# Patient Record
Sex: MALE | Race: WHITE | NOT HISPANIC OR LATINO | Employment: FULL TIME | URBAN - METROPOLITAN AREA
[De-identification: names, ages, dates, MRNs, and addresses within clinical notes are randomized per-mention and may not be internally consistent; named-entity substitution may affect disease eponyms.]

---

## 2023-04-08 LAB
ALANINE AMINOTRANSFERASE (SGPT) (U/L) IN SER/PLAS: 16 U/L (ref 10–52)
ALBUMIN (G/DL) IN SER/PLAS: 4 G/DL (ref 3.4–5)
ALKALINE PHOSPHATASE (U/L) IN SER/PLAS: 69 U/L (ref 33–120)
ANION GAP IN SER/PLAS: 9 MMOL/L (ref 10–20)
ASPARTATE AMINOTRANSFERASE (SGOT) (U/L) IN SER/PLAS: 16 U/L (ref 9–39)
BILIRUBIN TOTAL (MG/DL) IN SER/PLAS: 0.7 MG/DL (ref 0–1.2)
CALCIUM (MG/DL) IN SER/PLAS: 8.6 MG/DL (ref 8.6–10.3)
CARBON DIOXIDE, TOTAL (MMOL/L) IN SER/PLAS: 27 MMOL/L (ref 21–32)
CHLORIDE (MMOL/L) IN SER/PLAS: 106 MMOL/L (ref 98–107)
CHOLESTEROL (MG/DL) IN SER/PLAS: 156 MG/DL (ref 0–199)
CHOLESTEROL IN HDL (MG/DL) IN SER/PLAS: 42.7 MG/DL
CHOLESTEROL/HDL RATIO: 3.7
CREATININE (MG/DL) IN SER/PLAS: 0.8 MG/DL (ref 0.5–1.3)
ESTIMATED AVERAGE GLUCOSE FOR HBA1C: 111 MG/DL
GFR MALE: >90 ML/MIN/1.73M2
GLUCOSE (MG/DL) IN SER/PLAS: 91 MG/DL (ref 74–99)
HEMOGLOBIN A1C/HEMOGLOBIN TOTAL IN BLOOD: 5.5 %
LDL: 101 MG/DL (ref 0–99)
POTASSIUM (MMOL/L) IN SER/PLAS: 4.2 MMOL/L (ref 3.5–5.3)
PROTEIN TOTAL: 6.6 G/DL (ref 6.4–8.2)
SODIUM (MMOL/L) IN SER/PLAS: 138 MMOL/L (ref 136–145)
TRIGLYCERIDE (MG/DL) IN SER/PLAS: 62 MG/DL (ref 0–149)
UREA NITROGEN (MG/DL) IN SER/PLAS: 14 MG/DL (ref 6–23)
VLDL: 12 MG/DL (ref 0–40)

## 2023-04-13 ENCOUNTER — OFFICE VISIT (OUTPATIENT)
Dept: PRIMARY CARE | Facility: CLINIC | Age: 53
End: 2023-04-13
Payer: COMMERCIAL

## 2023-04-13 VITALS
DIASTOLIC BLOOD PRESSURE: 72 MMHG | TEMPERATURE: 97.8 F | SYSTOLIC BLOOD PRESSURE: 126 MMHG | WEIGHT: 168 LBS | HEART RATE: 67 BPM | OXYGEN SATURATION: 93 %

## 2023-04-13 DIAGNOSIS — J30.9 ALLERGIC RHINITIS, UNSPECIFIED SEASONALITY, UNSPECIFIED TRIGGER: ICD-10-CM

## 2023-04-13 DIAGNOSIS — M25.562 PAIN IN BOTH KNEES, UNSPECIFIED CHRONICITY: ICD-10-CM

## 2023-04-13 DIAGNOSIS — M25.561 PAIN IN BOTH KNEES, UNSPECIFIED CHRONICITY: ICD-10-CM

## 2023-04-13 DIAGNOSIS — R73.01 IMPAIRED FASTING BLOOD SUGAR: ICD-10-CM

## 2023-04-13 DIAGNOSIS — J45.20 MILD INTERMITTENT INTRINSIC ASTHMA WITHOUT STATUS ASTHMATICUS WITHOUT COMPLICATION (HHS-HCC): Primary | ICD-10-CM

## 2023-04-13 DIAGNOSIS — E78.2 MIXED HYPERLIPIDEMIA: ICD-10-CM

## 2023-04-13 PROCEDURE — 99214 OFFICE O/P EST MOD 30 MIN: CPT | Performed by: FAMILY MEDICINE

## 2023-04-13 RX ORDER — ALBUTEROL SULFATE 90 UG/1
2 AEROSOL, METERED RESPIRATORY (INHALATION) EVERY 6 HOURS PRN
COMMUNITY
Start: 2021-12-02

## 2023-04-13 RX ORDER — MONTELUKAST SODIUM 10 MG/1
10 TABLET ORAL NIGHTLY
Qty: 30 TABLET | Refills: 5 | Status: SHIPPED | OUTPATIENT
Start: 2023-04-13 | End: 2023-10-12 | Stop reason: SDUPTHER

## 2023-04-13 RX ORDER — FLUTICASONE FUROATE, UMECLIDINIUM BROMIDE AND VILANTEROL TRIFENATATE 100; 62.5; 25 UG/1; UG/1; UG/1
1 POWDER RESPIRATORY (INHALATION) DAILY
COMMUNITY
End: 2023-10-12 | Stop reason: SDUPTHER

## 2023-04-13 ASSESSMENT — ENCOUNTER SYMPTOMS
HEADACHES: 0
SLEEP DISTURBANCE: 0
DIFFICULTY URINATING: 0
BLOOD IN STOOL: 0
DYSURIA: 0
FATIGUE: 0
POLYDIPSIA: 0
DYSPHORIC MOOD: 0
ABDOMINAL DISTENTION: 0
SHORTNESS OF BREATH: 0
PALPITATIONS: 0
DIARRHEA: 0
POLYPHAGIA: 0
CONSTIPATION: 0
VOMITING: 0
ABDOMINAL PAIN: 0
NAUSEA: 0
DIZZINESS: 0

## 2023-04-13 NOTE — PATIENT INSTRUCTIONS
Recommend a predominant whole foods plant based diet.  Cut back on meat, dairy, salt and oils. Increase fiber in your diet.  Decrease alcohol as much as possible if you drink. Recommend regular exercise most days of the week.    Trial montelukast. You may not need trelegy if this helps.     You were referred for xrays. Trial home PT exercises. If no improvement, call for PT referral    Follow up in 6 months, sooner if needed

## 2023-04-13 NOTE — PROGRESS NOTES
"Subjective   Patient ID: Umer Gallegos is a 53 y.o. male who presents for Hyperlipidemia (Recheck) and COPD (Review  BW).    Hyperlipidemia  Pertinent negatives include no chest pain or shortness of breath.      Asthma: remains controlled. Using albuterol rarely. Concerned about inhaler cost  AR: stable. Not using meds   Lipids: Stable.  HDL 42,  (91), triglycerides 62  Prediabetes: Remains controlled.  A1c 5.5 and unchanged.  FBS normal  Knee pain: recurrent and onset for \"a while.\" No injury. No med for sxs. Occ feel swollen    Review of Systems   Constitutional:  Negative for fatigue.   HENT: Negative.     Eyes:  Negative for visual disturbance.   Respiratory:  Negative for shortness of breath.    Cardiovascular:  Negative for chest pain and palpitations.   Gastrointestinal:  Negative for abdominal distention, abdominal pain, blood in stool, constipation, diarrhea, nausea and vomiting.   Endocrine: Negative for cold intolerance, heat intolerance, polydipsia, polyphagia and polyuria.   Genitourinary:  Negative for difficulty urinating and dysuria.   Musculoskeletal:         As above   Skin:  Negative for rash.   Neurological:  Negative for dizziness and headaches.   Psychiatric/Behavioral:  Negative for dysphoric mood and sleep disturbance.        Objective   /72   Pulse 67   Temp 36.6 °C (97.8 °F) (Temporal)   Wt 76.2 kg (168 lb)   SpO2 93%     Physical Exam  Vitals and nursing note reviewed.   Constitutional:       General: He is not in acute distress.     Appearance: Normal appearance.   HENT:      Head: Normocephalic.   Eyes:      General: No scleral icterus.     Pupils: Pupils are equal, round, and reactive to light.   Neck:      Vascular: No carotid bruit.   Cardiovascular:      Rate and Rhythm: Normal rate and regular rhythm.      Pulses: Normal pulses.      Heart sounds: No murmur heard.  Pulmonary:      Effort: Pulmonary effort is normal. No respiratory distress.      Breath sounds: " Normal breath sounds.   Abdominal:      General: Bowel sounds are normal.   Musculoskeletal:      Right lower leg: No edema.      Left lower leg: No edema.      Comments: FROM b/l knees. No edema or joint line TTP. No obvious lax. Neg drawer sign. Gait nml   Skin:     General: Skin is warm.   Neurological:      General: No focal deficit present.      Mental Status: He is alert.      Cranial Nerves: No cranial nerve deficit.   Psychiatric:         Mood and Affect: Mood normal.         Assessment/Plan   Problem List Items Addressed This Visit          Respiratory    Intrinsic asthma without status asthmaticus - Primary    Relevant Medications    montelukast (Singulair) 10 mg tablet    Other Relevant Orders    Follow Up In Primary Care       Endocrine/Metabolic    Impaired fasting blood sugar    Relevant Orders    Follow Up In Primary Care    Comprehensive Metabolic Panel       Other    Mixed hyperlipidemia    Relevant Orders    Follow Up In Primary Care    Comprehensive Metabolic Panel    Lipid Panel     Other Visit Diagnoses       Allergic rhinitis, unspecified seasonality, unspecified trigger        Relevant Medications    montelukast (Singulair) 10 mg tablet    Pain in both knees, unspecified chronicity        Relevant Orders    XR knee 4+ views bilateral          Discussed blood work

## 2023-04-25 ENCOUNTER — TELEPHONE (OUTPATIENT)
Dept: PRIMARY CARE | Facility: CLINIC | Age: 53
End: 2023-04-25
Payer: COMMERCIAL

## 2023-04-25 NOTE — TELEPHONE ENCOUNTER
----- Message from Lucille Aguilar, DO sent at 4/24/2023  5:05 PM EDT -----  Please tell pt that his xrays do not show any significant abnormalities. See if willing to do PT

## 2023-07-31 ENCOUNTER — OFFICE VISIT (OUTPATIENT)
Dept: PRIMARY CARE | Facility: CLINIC | Age: 53
End: 2023-07-31
Payer: COMMERCIAL

## 2023-07-31 VITALS
HEART RATE: 76 BPM | SYSTOLIC BLOOD PRESSURE: 120 MMHG | OXYGEN SATURATION: 98 % | WEIGHT: 168 LBS | DIASTOLIC BLOOD PRESSURE: 72 MMHG | TEMPERATURE: 96.5 F

## 2023-07-31 DIAGNOSIS — S29.012A STRAIN OF THORACIC BACK REGION: Primary | ICD-10-CM

## 2023-07-31 PROCEDURE — 99214 OFFICE O/P EST MOD 30 MIN: CPT | Performed by: PHYSICIAN ASSISTANT

## 2023-07-31 RX ORDER — DICLOFENAC SODIUM 75 MG/1
75 TABLET, DELAYED RELEASE ORAL 2 TIMES DAILY PRN
Qty: 30 TABLET | Refills: 0 | Status: SHIPPED | OUTPATIENT
Start: 2023-07-31 | End: 2023-10-12 | Stop reason: ALTCHOICE

## 2023-07-31 RX ORDER — TIZANIDINE 2 MG/1
1-2 TABLET ORAL EVERY 8 HOURS PRN
Qty: 30 TABLET | Refills: 0 | Status: SHIPPED | OUTPATIENT
Start: 2023-07-31 | End: 2023-10-12 | Stop reason: ALTCHOICE

## 2023-07-31 ASSESSMENT — ENCOUNTER SYMPTOMS
SHORTNESS OF BREATH: 0
ABDOMINAL PAIN: 0
PALPITATIONS: 0

## 2023-07-31 NOTE — LETTER
July 31, 2023     Patient: Umer Gallegos   YOB: 1970   Date of Visit: 7/31/2023       To Whom It May Concern:    Umer Gallegos was seen in my clinic on 7/31/2023 at 2:50 pm. Please excuse Umer for his absence from work on this day to make the appointment.    If you have any questions or concerns, please don't hesitate to call.         Sincerely,         aR Bay PA-C        CC: No Recipients

## 2023-07-31 NOTE — PROGRESS NOTES
Subjective   Patient ID: Umer Gallegos is a 53 y.o. male who presents for Back Pain (Nki x 4 days).    HPI   Getting right upper back pain x 4 days. Thinks may have strained something when lifting his 3 year old granddaughter. Hurts in his upper back to lift his right arm.  No significant back pain.  No pain radiating down his arm or paresthesias in arm.  No chest pains or shortness of breath or significant cough.  Symptoms are not worsening.    Used some ibuprofen once and it helped a little.     reports that he has been smoking cigarettes. He has been smoking an average of .5 packs per day. He has never used smokeless tobacco.    Review of Systems   Respiratory:  Negative for shortness of breath.    Cardiovascular:  Negative for chest pain and palpitations.   Gastrointestinal:  Negative for abdominal pain.       Objective   /72   Pulse 76   Temp 35.8 °C (96.5 °F)   Wt 76.2 kg (168 lb)   SpO2 98%     Physical Exam  HENT:      Head: Normocephalic.   Eyes:      General: No scleral icterus.  Cardiovascular:      Rate and Rhythm: Regular rhythm.   Pulmonary:      Effort: Pulmonary effort is normal.      Breath sounds: Normal breath sounds.   Abdominal:      Palpations: Abdomen is soft. There is no mass.      Tenderness: There is no abdominal tenderness.   Musculoskeletal:      Comments: Has tenderness and tension over the right upper thoracic musculature without vertebral tenderness.   Skin:     General: Skin is warm and dry.   Neurological:      Mental Status: He is alert.   Psychiatric:         Mood and Affect: Affect normal.         Assessment/Plan   Diagnoses and all orders for this visit:  Strain of thoracic back region  -     diclofenac (Voltaren) 75 mg EC tablet; Take 1 tablet (75 mg) by mouth 2 times a day as needed (pain). Do not crush, chew, or split.  -     tiZANidine (Zanaflex) 2 mg tablet; Take 0.5-1 tablets (1-2 mg) by mouth every 8 hours if needed for muscle spasms.       Use heating pad  on back and reviewed back stretches to do daily.  Rx diclofenac twice daily with food.  Rx tizanidine half to 1 p.o. 3 times daily as needed (warned of sedation).  Follow-up if symptoms significantly increase or persist.

## 2023-07-31 NOTE — LETTER
August 1, 2023     Patient: Umer Gallegos   YOB: 1970   Date of Visit: 7/31/2023       To Whom It May Concern:    Umer Gallegos was seen in my clinic on 7/31/2023 at 2:50 pm. Please excuse Umer for his absence from work 7/31/23 - 8/1/23.     If you have any questions or concerns, please don't hesitate to call.         Sincerely,         Ra Bay PA-C        CC: No Recipients

## 2023-08-01 ENCOUNTER — TELEPHONE (OUTPATIENT)
Dept: PRIMARY CARE | Facility: CLINIC | Age: 53
End: 2023-08-01
Payer: COMMERCIAL

## 2023-08-01 NOTE — TELEPHONE ENCOUNTER
Patient was informed if he needed another day to call in. He needs a work excuse today. He saw [facility] call wife when ready to .

## 2023-10-06 ENCOUNTER — LAB (OUTPATIENT)
Dept: LAB | Facility: LAB | Age: 53
End: 2023-10-06
Payer: COMMERCIAL

## 2023-10-06 DIAGNOSIS — R73.01 IMPAIRED FASTING BLOOD SUGAR: ICD-10-CM

## 2023-10-06 DIAGNOSIS — E78.2 MIXED HYPERLIPIDEMIA: ICD-10-CM

## 2023-10-06 LAB
ALBUMIN SERPL BCP-MCNC: 4 G/DL (ref 3.4–5)
ALP SERPL-CCNC: 68 U/L (ref 33–120)
ALT SERPL W P-5'-P-CCNC: 20 U/L (ref 10–52)
ANION GAP SERPL CALC-SCNC: 11 MMOL/L (ref 10–20)
AST SERPL W P-5'-P-CCNC: 17 U/L (ref 9–39)
BILIRUB SERPL-MCNC: 1 MG/DL (ref 0–1.2)
BUN SERPL-MCNC: 13 MG/DL (ref 6–23)
CALCIUM SERPL-MCNC: 8.9 MG/DL (ref 8.6–10.3)
CHLORIDE SERPL-SCNC: 107 MMOL/L (ref 98–107)
CHOLEST SERPL-MCNC: 151 MG/DL (ref 0–199)
CHOLESTEROL/HDL RATIO: 4.2
CO2 SERPL-SCNC: 24 MMOL/L (ref 21–32)
CREAT SERPL-MCNC: 0.91 MG/DL (ref 0.5–1.3)
GFR SERPL CREATININE-BSD FRML MDRD: >90 ML/MIN/1.73M*2
GLUCOSE SERPL-MCNC: 94 MG/DL (ref 74–99)
HDLC SERPL-MCNC: 35.9 MG/DL
LDLC SERPL CALC-MCNC: 101 MG/DL (ref 140–190)
NON HDL CHOLESTEROL: 115 MG/DL (ref 0–149)
POTASSIUM SERPL-SCNC: 4.3 MMOL/L (ref 3.5–5.3)
PROT SERPL-MCNC: 6.5 G/DL (ref 6.4–8.2)
SODIUM SERPL-SCNC: 138 MMOL/L (ref 136–145)
TRIGL SERPL-MCNC: 73 MG/DL (ref 0–149)
VLDL: 15 MG/DL (ref 0–40)

## 2023-10-06 PROCEDURE — 80061 LIPID PANEL: CPT

## 2023-10-06 PROCEDURE — 80053 COMPREHEN METABOLIC PANEL: CPT

## 2023-10-06 PROCEDURE — 36415 COLL VENOUS BLD VENIPUNCTURE: CPT

## 2023-10-12 ENCOUNTER — OFFICE VISIT (OUTPATIENT)
Dept: PRIMARY CARE | Facility: CLINIC | Age: 53
End: 2023-10-12
Payer: COMMERCIAL

## 2023-10-12 VITALS
DIASTOLIC BLOOD PRESSURE: 68 MMHG | SYSTOLIC BLOOD PRESSURE: 116 MMHG | HEART RATE: 61 BPM | TEMPERATURE: 97.9 F | OXYGEN SATURATION: 96 % | BODY MASS INDEX: 26.68 KG/M2 | WEIGHT: 166 LBS | HEIGHT: 66 IN

## 2023-10-12 DIAGNOSIS — R73.01 IMPAIRED FASTING BLOOD SUGAR: ICD-10-CM

## 2023-10-12 DIAGNOSIS — Z12.5 SCREENING FOR PROSTATE CANCER: ICD-10-CM

## 2023-10-12 DIAGNOSIS — J30.9 ALLERGIC RHINITIS, UNSPECIFIED SEASONALITY, UNSPECIFIED TRIGGER: ICD-10-CM

## 2023-10-12 DIAGNOSIS — J45.20 MILD INTERMITTENT INTRINSIC ASTHMA WITHOUT STATUS ASTHMATICUS WITHOUT COMPLICATION (HHS-HCC): ICD-10-CM

## 2023-10-12 DIAGNOSIS — E78.2 MIXED HYPERLIPIDEMIA: Primary | ICD-10-CM

## 2023-10-12 DIAGNOSIS — Z00.00 ROUTINE GENERAL MEDICAL EXAMINATION AT A HEALTH CARE FACILITY: ICD-10-CM

## 2023-10-12 PROCEDURE — 99214 OFFICE O/P EST MOD 30 MIN: CPT | Performed by: FAMILY MEDICINE

## 2023-10-12 PROCEDURE — 99396 PREV VISIT EST AGE 40-64: CPT | Performed by: FAMILY MEDICINE

## 2023-10-12 RX ORDER — MONTELUKAST SODIUM 10 MG/1
10 TABLET ORAL NIGHTLY
Qty: 90 TABLET | Refills: 1 | Status: SHIPPED | OUTPATIENT
Start: 2023-10-12 | End: 2024-04-11

## 2023-10-12 RX ORDER — FLUTICASONE FUROATE, UMECLIDINIUM BROMIDE AND VILANTEROL TRIFENATATE 100; 62.5; 25 UG/1; UG/1; UG/1
1 POWDER RESPIRATORY (INHALATION) DAILY
Qty: 28 EACH | Refills: 0 | Status: SHIPPED | OUTPATIENT
Start: 2023-10-12 | End: 2024-03-21

## 2023-10-12 ASSESSMENT — ENCOUNTER SYMPTOMS
ACTIVITY CHANGE: 0
FREQUENCY: 0
WHEEZING: 0
SORE THROAT: 0
PALPITATIONS: 0
LIGHT-HEADEDNESS: 0
APPETITE CHANGE: 0
VOMITING: 0
ABDOMINAL PAIN: 0
CHILLS: 0
ARTHRALGIAS: 0
DIARRHEA: 0
DIFFICULTY URINATING: 0
NAUSEA: 0
DIZZINESS: 0
RHINORRHEA: 0
DYSURIA: 0
MYALGIAS: 0
SHORTNESS OF BREATH: 0
COUGH: 0
WEAKNESS: 0
NUMBNESS: 0
FEVER: 0
CONSTIPATION: 0

## 2023-10-12 ASSESSMENT — PATIENT HEALTH QUESTIONNAIRE - PHQ9
SUM OF ALL RESPONSES TO PHQ9 QUESTIONS 1 AND 2: 0
1. LITTLE INTEREST OR PLEASURE IN DOING THINGS: NOT AT ALL
2. FEELING DOWN, DEPRESSED OR HOPELESS: NOT AT ALL

## 2023-10-12 NOTE — PATIENT INSTRUCTIONS
Recommend a predominant whole foods plant based diet.  Cut back on meat, dairy, salt and oils. Increase fiber in your diet.  Decrease alcohol as much as possible if you drink. Recommend regular exercise most days of the week.    Continue your current meds    Return in 6 months,sooner if needed

## 2023-10-12 NOTE — PROGRESS NOTES
Subjective   Patient ID: Umer Gallegos is a 53 y.o. male who presents for Asthma (Recheck) and Hyperlipidemia (Review BW), CPE and multiple issues.     HPI     URI: he had symptoms of a URI last week including sore throat, cough, postnasal drip.  He states that he feels better today and is back to 100%.  He denies fever, chills.  Asthma: Continues to be well controlled.  He is using the montelukast with significant relief.  Taking Trelegy and albuterol rarely.  He will use albuterol for an acute attack and Trelegy if his symptoms flare for couple days.    AR: Well-controlled.  Not using any OTC meds at this time.    Lipids: Stable.  HDL 35, , triglycerides 73  Prediabetes: Remains controlled.  Last A1c 5.5. FBS with the most recent blood work WNL  Knee pain: He has not had significant pain in either knee. He has been avoiding bowling which typically flares his pain  BMI: 26. Active.     Health maintenance:  Cologuard negative, completed on 11/19/2021  Patient agreeable to add on PSA to next blood work  Patient declined influenza or pneumococcal vaccinations as well as HIV and hep C screening    Review of Systems   Constitutional:  Negative for activity change, appetite change, chills and fever.   HENT:  Negative for congestion, ear pain, postnasal drip, rhinorrhea and sore throat.    Respiratory:  Negative for cough, shortness of breath and wheezing.    Cardiovascular:  Negative for chest pain and palpitations.   Gastrointestinal:  Negative for abdominal pain, constipation, diarrhea, nausea and vomiting.   Genitourinary:  Negative for difficulty urinating, dysuria and frequency.   Musculoskeletal:  Negative for arthralgias and myalgias.   Skin:  Negative for rash.   Neurological:  Negative for dizziness, weakness, light-headedness and numbness.       Objective   /68   Pulse 61   Temp 36.6 °C (97.9 °F)   Wt 75.3 kg (166 lb)   SpO2 96%   BMI 26.81 kg/m²     Physical Exam  Vitals and nursing note  reviewed.   Constitutional:       General: He is not in acute distress.     Appearance: Normal appearance.   HENT:      Head: Normocephalic and atraumatic.      Right Ear: Tympanic membrane and ear canal normal.      Left Ear: Tympanic membrane and ear canal normal.      Nose: No congestion or rhinorrhea.      Mouth/Throat:      Mouth: Mucous membranes are moist.      Pharynx: Oropharynx is clear.   Eyes:      General: No scleral icterus.     Conjunctiva/sclera: Conjunctivae normal.   Cardiovascular:      Rate and Rhythm: Normal rate and regular rhythm.      Pulses: Normal pulses.      Heart sounds: Normal heart sounds. No murmur heard.  Pulmonary:      Effort: Pulmonary effort is normal. No respiratory distress.      Breath sounds: Normal breath sounds. No wheezing or rhonchi.   Abdominal:      General: Bowel sounds are normal. There is no distension.      Tenderness: There is no abdominal tenderness. There is no guarding or rebound.   Genitourinary:     Comments: Declines exam  Musculoskeletal:         General: Normal range of motion.      Cervical back: Normal range of motion and neck supple. No tenderness.      Right lower leg: No edema.      Left lower leg: No edema.   Lymphadenopathy:      Cervical: No cervical adenopathy.   Skin:     General: Skin is warm.   Neurological:      General: No focal deficit present.      Mental Status: He is alert and oriented to person, place, and time.   Psychiatric:         Mood and Affect: Mood normal.         Assessment/Plan   Diagnoses and all orders for this visit:  Mixed hyperlipidemia  -     Lipid Panel; Future  Mild intermittent intrinsic asthma without status asthmaticus without complication  -     montelukast (Singulair) 10 mg tablet; Take 1 tablet (10 mg) by mouth once daily at bedtime.  -     fluticasone-umeclidin-vilanter (Trelegy Ellipta) 100-62.5-25 mcg blister with device; Inhale 1 puff once daily.  Allergic rhinitis, unspecified seasonality, unspecified  trigger  -     montelukast (Singulair) 10 mg tablet; Take 1 tablet (10 mg) by mouth once daily at bedtime.  Impaired fasting blood sugar  -     Comprehensive Metabolic Panel; Future  -     Hemoglobin A1C; Future  Screening for prostate cancer  -     Prostate Specific Antigen; Future  Routine general medical examination at a health care facility  Patient deferred influenza and pneumococcal vaccines as well as HIV and hep C screening.  Asthma well-controlled-refill montelukast, Roseanna  Repeat blood work in 6 months    Goyo Ruiz DO  Primary Care Sports Medicine Fellow

## 2024-03-20 DIAGNOSIS — J45.20 MILD INTERMITTENT INTRINSIC ASTHMA WITHOUT STATUS ASTHMATICUS WITHOUT COMPLICATION (HHS-HCC): ICD-10-CM

## 2024-03-21 RX ORDER — FLUTICASONE FUROATE, UMECLIDINIUM BROMIDE AND VILANTEROL TRIFENATATE 100; 62.5; 25 UG/1; UG/1; UG/1
1 POWDER RESPIRATORY (INHALATION) DAILY
Qty: 60 EACH | Refills: 2 | Status: SHIPPED | OUTPATIENT
Start: 2024-03-21 | End: 2024-04-11

## 2024-03-21 NOTE — TELEPHONE ENCOUNTER
Wife called needs Prior Auth   Blakegy Ellipta 100-62.5-25 mcg blister with device  [facility] [place]

## 2024-03-25 NOTE — TELEPHONE ENCOUNTER
Received fax from [employer], Pt's Trelegy has been denied.   Covered alternatives are Anoro, Dulera, Advair, Symbicort, Atrovent HFA, Combivent, Spiriva, Asmanex, Flovent and fluticasone Diskus or HFA inhalers.   Please advise. Thanks. JW

## 2024-03-28 DIAGNOSIS — J45.20 MILD INTERMITTENT INTRINSIC ASTHMA WITHOUT STATUS ASTHMATICUS WITHOUT COMPLICATION (HHS-HCC): Primary | ICD-10-CM

## 2024-03-28 RX ORDER — BUDESONIDE AND FORMOTEROL FUMARATE DIHYDRATE 80; 4.5 UG/1; UG/1
2 AEROSOL RESPIRATORY (INHALATION)
Qty: 10.2 G | Refills: 2 | Status: SHIPPED | OUTPATIENT
Start: 2024-03-28 | End: 2024-04-11 | Stop reason: SDUPTHER

## 2024-03-28 NOTE — TELEPHONE ENCOUNTER
Called and spoke with pt, he is willing to try alternative medication  Please send to [facility]. Thanks. JW

## 2024-03-29 ENCOUNTER — TELEPHONE (OUTPATIENT)
Dept: PRIMARY CARE | Facility: CLINIC | Age: 54
End: 2024-03-29
Payer: COMMERCIAL

## 2024-03-29 NOTE — TELEPHONE ENCOUNTER
PA denied for Trelegy,   Coverage is provided when the member has a history of at least a 14-day trial of two preferred medications (medications covered by the plan) that is within the same class and duration of action. Medications include but are not limited to: Advair HFA and Diskus (Brand is covered by plan), Dulera, and Symbicort (Brand is covered by plan). AND documentation must be provided why the preferred Brand product (Symbicort) cannot be used or may cause harm. The plan covers the brand name medication [Symbicort] without a prior authorization. Please have the pharmacy process the brand name medication [Symbicort] using a SHERMAN 9 when processing the prescription.The [facility] Policy for Medical Necessity and [place] Unified Preferred Drug List criteria were reviewed and per [place] Administrative Code Rule 5160-1-01 (C) and 5160-26-03 (B), a medically necessary service must include: generally accepted standards of medical practice, be clinically appropriate in administration, treatment and outcome and be the lowest cost alternative to effectively treat the condition. Please contact your provider to assist you with other treatment options that might be covered under your benefit package, or other services that might be available through the community.      Please advise. Thanks. SALINAS

## 2024-04-04 ENCOUNTER — LAB (OUTPATIENT)
Dept: LAB | Facility: LAB | Age: 54
End: 2024-04-04
Payer: COMMERCIAL

## 2024-04-04 DIAGNOSIS — R73.01 IMPAIRED FASTING BLOOD SUGAR: ICD-10-CM

## 2024-04-04 DIAGNOSIS — E78.2 MIXED HYPERLIPIDEMIA: ICD-10-CM

## 2024-04-04 DIAGNOSIS — Z12.5 SCREENING FOR PROSTATE CANCER: ICD-10-CM

## 2024-04-04 LAB
ALBUMIN SERPL BCP-MCNC: 4.1 G/DL (ref 3.4–5)
ALP SERPL-CCNC: 68 U/L (ref 33–120)
ALT SERPL W P-5'-P-CCNC: 24 U/L (ref 10–52)
ANION GAP SERPL CALC-SCNC: 10 MMOL/L (ref 10–20)
AST SERPL W P-5'-P-CCNC: 19 U/L (ref 9–39)
BILIRUB SERPL-MCNC: 1.5 MG/DL (ref 0–1.2)
BUN SERPL-MCNC: 12 MG/DL (ref 6–23)
CALCIUM SERPL-MCNC: 9.1 MG/DL (ref 8.6–10.3)
CHLORIDE SERPL-SCNC: 103 MMOL/L (ref 98–107)
CHOLEST SERPL-MCNC: 169 MG/DL (ref 0–199)
CHOLESTEROL/HDL RATIO: 3.9
CO2 SERPL-SCNC: 28 MMOL/L (ref 21–32)
CREAT SERPL-MCNC: 0.9 MG/DL (ref 0.5–1.3)
EGFRCR SERPLBLD CKD-EPI 2021: >90 ML/MIN/1.73M*2
EST. AVERAGE GLUCOSE BLD GHB EST-MCNC: 117 MG/DL
GLUCOSE SERPL-MCNC: 87 MG/DL (ref 74–99)
HBA1C MFR BLD: 5.7 %
HDLC SERPL-MCNC: 43.2 MG/DL
LDLC SERPL CALC-MCNC: 108 MG/DL
NON HDL CHOLESTEROL: 126 MG/DL (ref 0–149)
POTASSIUM SERPL-SCNC: 4.2 MMOL/L (ref 3.5–5.3)
PROT SERPL-MCNC: 7.2 G/DL (ref 6.4–8.2)
PSA SERPL-MCNC: 0.8 NG/ML
SODIUM SERPL-SCNC: 137 MMOL/L (ref 136–145)
TRIGL SERPL-MCNC: 87 MG/DL (ref 0–149)
VLDL: 17 MG/DL (ref 0–40)

## 2024-04-04 PROCEDURE — 36415 COLL VENOUS BLD VENIPUNCTURE: CPT

## 2024-04-04 PROCEDURE — 83036 HEMOGLOBIN GLYCOSYLATED A1C: CPT

## 2024-04-04 PROCEDURE — 80053 COMPREHEN METABOLIC PANEL: CPT

## 2024-04-04 PROCEDURE — 80061 LIPID PANEL: CPT

## 2024-04-04 PROCEDURE — 84153 ASSAY OF PSA TOTAL: CPT

## 2024-04-11 ENCOUNTER — OFFICE VISIT (OUTPATIENT)
Dept: PRIMARY CARE | Facility: CLINIC | Age: 54
End: 2024-04-11
Payer: COMMERCIAL

## 2024-04-11 VITALS
BODY MASS INDEX: 27.45 KG/M2 | DIASTOLIC BLOOD PRESSURE: 70 MMHG | OXYGEN SATURATION: 98 % | SYSTOLIC BLOOD PRESSURE: 126 MMHG | TEMPERATURE: 97.7 F | WEIGHT: 170 LBS | HEART RATE: 88 BPM

## 2024-04-11 DIAGNOSIS — J45.20 MILD INTERMITTENT INTRINSIC ASTHMA WITHOUT STATUS ASTHMATICUS WITHOUT COMPLICATION (HHS-HCC): ICD-10-CM

## 2024-04-11 DIAGNOSIS — R73.03 PREDIABETES: ICD-10-CM

## 2024-04-11 DIAGNOSIS — R94.5 LIVER FUNCTION STUDY, ABNORMAL: ICD-10-CM

## 2024-04-11 DIAGNOSIS — E78.2 MIXED HYPERLIPIDEMIA: Primary | ICD-10-CM

## 2024-04-11 PROBLEM — J30.9 ALLERGIC RHINITIS: Status: ACTIVE | Noted: 2024-04-11

## 2024-04-11 PROCEDURE — 99214 OFFICE O/P EST MOD 30 MIN: CPT | Performed by: FAMILY MEDICINE

## 2024-04-11 RX ORDER — BUDESONIDE AND FORMOTEROL FUMARATE DIHYDRATE 80; 4.5 UG/1; UG/1
2 AEROSOL RESPIRATORY (INHALATION)
Qty: 10.2 G | Refills: 3 | Status: SHIPPED | OUTPATIENT
Start: 2024-04-11 | End: 2024-04-12 | Stop reason: SDUPTHER

## 2024-04-11 ASSESSMENT — ENCOUNTER SYMPTOMS
MYALGIAS: 0
ABDOMINAL DISTENTION: 0
NAUSEA: 0
CONSTIPATION: 0
DIARRHEA: 0
POLYPHAGIA: 0
DIZZINESS: 0
SHORTNESS OF BREATH: 0
POLYDIPSIA: 0
ABDOMINAL PAIN: 0
SLEEP DISTURBANCE: 0
FATIGUE: 0
HEADACHES: 0
DYSPHORIC MOOD: 0
PALPITATIONS: 0
VOMITING: 0
BLOOD IN STOOL: 0

## 2024-04-11 NOTE — PROGRESS NOTES
Subjective   Patient ID: Umer Gallegos is a 54 y.o. male who presents for Hyperlipidemia (Review BW) and multiple issues.     Hyperlipidemia  Pertinent negatives include no chest pain, myalgias or shortness of breath.      Lipids: stable. HDL 43, (101), TG 87  LFTs: jillian slightly high 1.5. n oabd pain  Predm: trending up 5.7%  Asthma: symbicort helped. Trelegy not covered. Cat recently passed so feels breathing and allergies may improve    Review of Systems   Constitutional:  Negative for fatigue.   HENT: Negative.     Eyes:  Negative for visual disturbance.   Respiratory:  Negative for shortness of breath.    Cardiovascular:  Negative for chest pain and palpitations.   Gastrointestinal:  Negative for abdominal distention, abdominal pain, blood in stool, constipation, diarrhea, nausea and vomiting.   Endocrine: Negative for polydipsia, polyphagia and polyuria.   Musculoskeletal:  Negative for myalgias.   Skin:  Negative for rash.   Neurological:  Negative for dizziness and headaches.   Psychiatric/Behavioral:  Negative for dysphoric mood and sleep disturbance.        Objective   /70   Pulse 88   Temp 36.5 °C (97.7 °F)   Wt 77.1 kg (170 lb)   SpO2 98%   BMI 27.45 kg/m²     Physical Exam  Vitals and nursing note reviewed.   Constitutional:       General: He is not in acute distress.     Appearance: Normal appearance. He is not toxic-appearing.   HENT:      Head: Normocephalic.   Eyes:      General: No scleral icterus.     Pupils: Pupils are equal, round, and reactive to light.   Neck:      Vascular: No carotid bruit.   Cardiovascular:      Rate and Rhythm: Normal rate and regular rhythm.      Heart sounds: No murmur heard.  Pulmonary:      Effort: Pulmonary effort is normal. No respiratory distress.      Breath sounds: Normal breath sounds.   Abdominal:      Palpations: Abdomen is soft.      Tenderness: There is no abdominal tenderness. There is no guarding.   Musculoskeletal:      Right lower  leg: No edema.      Left lower leg: No edema.   Skin:     General: Skin is warm.   Neurological:      General: No focal deficit present.      Mental Status: He is alert.      Cranial Nerves: No cranial nerve deficit.   Psychiatric:         Mood and Affect: Mood normal.         Assessment/Plan   Problem List Items Addressed This Visit             ICD-10-CM    Intrinsic asthma without status asthmaticus J45.20    Relevant Medications    budesonide-formoteroL (Symbicort) 80-4.5 mcg/actuation inhaler    Other Relevant Orders    Comprehensive Metabolic Panel    Mixed hyperlipidemia - Primary E78.2    Relevant Orders    Comprehensive Metabolic Panel    Lipid Panel     Other Visit Diagnoses         Codes    Prediabetes     R73.03    Relevant Orders    Comprehensive Metabolic Panel    Hemoglobin A1C    Liver function study, abnormal     R94.5    Relevant Orders    Comprehensive Metabolic Panel        Discussed bw and cv risk. Declines statin. Recommendations given

## 2024-04-11 NOTE — PATIENT INSTRUCTIONS
Recommend a predominant low fat whole foods plant based diet.  Cut back on meat, dairy, processed carbs, salt and oils(especially palm and coconut). Increase fiber in your diet.  Decrease alcohol as much as possible if you drink. Recommend regular exercise most days of the week(goal up to 150min per week). Also recommend good sleep habits aiming for 7-8 hours per night.     Continue your current med    Return in 6 months, sooner if needed

## 2024-04-12 ENCOUNTER — TELEPHONE (OUTPATIENT)
Dept: PRIMARY CARE | Facility: CLINIC | Age: 54
End: 2024-04-12
Payer: COMMERCIAL

## 2024-04-12 DIAGNOSIS — J45.20 MILD INTERMITTENT INTRINSIC ASTHMA WITHOUT STATUS ASTHMATICUS WITHOUT COMPLICATION (HHS-HCC): ICD-10-CM

## 2024-04-12 NOTE — TELEPHONE ENCOUNTER
PA on pended med - DENIED    Insurance will cover:  -Advair HYFA- Brand name  -Advair Diskus- Brand name  -Dulera - Brand name  -Symbicort - Brand name    Please advise Thx

## 2024-04-18 RX ORDER — BUDESONIDE AND FORMOTEROL FUMARATE DIHYDRATE 80; 4.5 UG/1; UG/1
2 AEROSOL RESPIRATORY (INHALATION)
Qty: 10.2 G | Refills: 3 | Status: SHIPPED | OUTPATIENT
Start: 2024-04-18 | End: 2025-04-18

## 2024-08-20 DIAGNOSIS — J30.9 ALLERGIC RHINITIS, UNSPECIFIED SEASONALITY, UNSPECIFIED TRIGGER: ICD-10-CM

## 2024-08-20 DIAGNOSIS — J45.20 MILD INTERMITTENT INTRINSIC ASTHMA WITHOUT STATUS ASTHMATICUS WITHOUT COMPLICATION (HHS-HCC): ICD-10-CM

## 2024-08-20 RX ORDER — MONTELUKAST SODIUM 10 MG/1
10 TABLET ORAL NIGHTLY
Qty: 90 TABLET | Refills: 0 | Status: SHIPPED | OUTPATIENT
Start: 2024-08-20 | End: 2024-11-18

## 2024-08-20 NOTE — TELEPHONE ENCOUNTER
Wife called rx line at 913a requesting a refill on pended med    Next OV 10/14  Pt compliant  Last rx - 10/12/23- 6 mos  Pt uses Kristyn Angulox

## 2024-10-05 ENCOUNTER — LAB (OUTPATIENT)
Dept: LAB | Facility: LAB | Age: 54
End: 2024-10-05
Payer: COMMERCIAL

## 2024-10-05 DIAGNOSIS — J45.20 MILD INTERMITTENT INTRINSIC ASTHMA WITHOUT STATUS ASTHMATICUS WITHOUT COMPLICATION (HHS-HCC): ICD-10-CM

## 2024-10-05 DIAGNOSIS — E78.2 MIXED HYPERLIPIDEMIA: ICD-10-CM

## 2024-10-05 DIAGNOSIS — R94.5 LIVER FUNCTION STUDY, ABNORMAL: ICD-10-CM

## 2024-10-05 DIAGNOSIS — R73.03 PREDIABETES: ICD-10-CM

## 2024-10-05 LAB
ALBUMIN SERPL BCP-MCNC: 4.3 G/DL (ref 3.4–5)
ALP SERPL-CCNC: 75 U/L (ref 33–120)
ALT SERPL W P-5'-P-CCNC: 17 U/L (ref 10–52)
ANION GAP SERPL CALC-SCNC: 11 MMOL/L (ref 10–20)
AST SERPL W P-5'-P-CCNC: 16 U/L (ref 9–39)
BILIRUB SERPL-MCNC: 0.4 MG/DL (ref 0–1.2)
BUN SERPL-MCNC: 9 MG/DL (ref 6–23)
CALCIUM SERPL-MCNC: 9.3 MG/DL (ref 8.6–10.3)
CHLORIDE SERPL-SCNC: 106 MMOL/L (ref 98–107)
CHOLEST SERPL-MCNC: 176 MG/DL (ref 0–199)
CHOLESTEROL/HDL RATIO: 3.7
CO2 SERPL-SCNC: 28 MMOL/L (ref 21–32)
CREAT SERPL-MCNC: 0.92 MG/DL (ref 0.5–1.3)
EGFRCR SERPLBLD CKD-EPI 2021: >90 ML/MIN/1.73M*2
EST. AVERAGE GLUCOSE BLD GHB EST-MCNC: 108 MG/DL
GLUCOSE SERPL-MCNC: 88 MG/DL (ref 74–99)
HBA1C MFR BLD: 5.4 %
HDLC SERPL-MCNC: 47.1 MG/DL
LDLC SERPL CALC-MCNC: 111 MG/DL
NON HDL CHOLESTEROL: 129 MG/DL (ref 0–149)
POTASSIUM SERPL-SCNC: 4.5 MMOL/L (ref 3.5–5.3)
PROT SERPL-MCNC: 7 G/DL (ref 6.4–8.2)
SODIUM SERPL-SCNC: 140 MMOL/L (ref 136–145)
TRIGL SERPL-MCNC: 88 MG/DL (ref 0–149)
VLDL: 18 MG/DL (ref 0–40)

## 2024-10-05 PROCEDURE — 83036 HEMOGLOBIN GLYCOSYLATED A1C: CPT

## 2024-10-05 PROCEDURE — 80061 LIPID PANEL: CPT

## 2024-10-05 PROCEDURE — 36415 COLL VENOUS BLD VENIPUNCTURE: CPT

## 2024-10-05 PROCEDURE — 80053 COMPREHEN METABOLIC PANEL: CPT

## 2024-10-14 ENCOUNTER — APPOINTMENT (OUTPATIENT)
Dept: PRIMARY CARE | Facility: CLINIC | Age: 54
End: 2024-10-14
Payer: COMMERCIAL

## 2024-10-22 ENCOUNTER — APPOINTMENT (OUTPATIENT)
Dept: PRIMARY CARE | Facility: CLINIC | Age: 54
End: 2024-10-22
Payer: COMMERCIAL

## 2024-10-22 VITALS
HEIGHT: 66 IN | WEIGHT: 173.2 LBS | TEMPERATURE: 97.8 F | OXYGEN SATURATION: 97 % | HEART RATE: 62 BPM | SYSTOLIC BLOOD PRESSURE: 140 MMHG | DIASTOLIC BLOOD PRESSURE: 70 MMHG | BODY MASS INDEX: 27.83 KG/M2

## 2024-10-22 DIAGNOSIS — Z11.59 NEED FOR HEPATITIS C SCREENING TEST: ICD-10-CM

## 2024-10-22 DIAGNOSIS — E78.2 MIXED HYPERLIPIDEMIA: ICD-10-CM

## 2024-10-22 DIAGNOSIS — Z12.5 SCREENING FOR PROSTATE CANCER: ICD-10-CM

## 2024-10-22 DIAGNOSIS — Z00.00 ROUTINE GENERAL MEDICAL EXAMINATION AT A HEALTH CARE FACILITY: Primary | ICD-10-CM

## 2024-10-22 DIAGNOSIS — J30.9 ALLERGIC RHINITIS, UNSPECIFIED SEASONALITY, UNSPECIFIED TRIGGER: ICD-10-CM

## 2024-10-22 DIAGNOSIS — J45.20 MILD INTERMITTENT INTRINSIC ASTHMA WITHOUT STATUS ASTHMATICUS WITHOUT COMPLICATION (HHS-HCC): ICD-10-CM

## 2024-10-22 DIAGNOSIS — E66.3 OVERWEIGHT (BMI 25.0-29.9): ICD-10-CM

## 2024-10-22 DIAGNOSIS — R73.03 PREDIABETES: ICD-10-CM

## 2024-10-22 DIAGNOSIS — Z12.11 SCREEN FOR COLON CANCER: ICD-10-CM

## 2024-10-22 DIAGNOSIS — R94.5 LIVER FUNCTION STUDY, ABNORMAL: ICD-10-CM

## 2024-10-22 PROCEDURE — 3008F BODY MASS INDEX DOCD: CPT | Performed by: FAMILY MEDICINE

## 2024-10-22 PROCEDURE — 99214 OFFICE O/P EST MOD 30 MIN: CPT | Performed by: FAMILY MEDICINE

## 2024-10-22 PROCEDURE — 99396 PREV VISIT EST AGE 40-64: CPT | Performed by: FAMILY MEDICINE

## 2024-10-22 RX ORDER — MONTELUKAST SODIUM 10 MG/1
10 TABLET ORAL NIGHTLY
Qty: 90 TABLET | Refills: 1 | Status: SHIPPED | OUTPATIENT
Start: 2024-10-22 | End: 2025-04-20

## 2024-10-22 ASSESSMENT — ENCOUNTER SYMPTOMS
DIZZINESS: 0
FATIGUE: 0
ARTHRALGIAS: 0
SLEEP DISTURBANCE: 0
HEADACHES: 0
DIARRHEA: 0
POLYPHAGIA: 0
SHORTNESS OF BREATH: 0
DYSPHORIC MOOD: 0
POLYDIPSIA: 0
VOMITING: 0
PALPITATIONS: 0
DIFFICULTY URINATING: 0
NAUSEA: 0
BLOOD IN STOOL: 0
CONSTIPATION: 0
ABDOMINAL PAIN: 0
MYALGIAS: 0
DYSURIA: 0

## 2024-10-22 ASSESSMENT — PATIENT HEALTH QUESTIONNAIRE - PHQ9
SUM OF ALL RESPONSES TO PHQ9 QUESTIONS 1 AND 2: 0
1. LITTLE INTEREST OR PLEASURE IN DOING THINGS: NOT AT ALL
2. FEELING DOWN, DEPRESSED OR HOPELESS: NOT AT ALL
SUM OF ALL RESPONSES TO PHQ9 QUESTIONS 1 AND 2: 0
1. LITTLE INTEREST OR PLEASURE IN DOING THINGS: NOT AT ALL
2. FEELING DOWN, DEPRESSED OR HOPELESS: NOT AT ALL

## 2024-10-22 NOTE — PROGRESS NOTES
Subjective   Patient ID: Umer Gallegos is a 54 y.o. male who presents for Hyperlipidemia (Review bw ). CPE and multiple issues.     Hyperlipidemia  Pertinent negatives include no chest pain, myalgias or shortness of breath.      Lipids: still borderline. HD 47, (108), TG 88  Predm: improving. A1c 5.4(5.7)  LFTs: now nml  BMI: sl high. Trying to work on diet.   Asthma/AR: asthma improved since got rid of cats. Rarely using inhalers. Allergies controlled overall.     Review of Systems   Constitutional:  Negative for fatigue.   HENT: Negative.     Eyes:  Negative for visual disturbance.   Respiratory:  Negative for shortness of breath.    Cardiovascular:  Negative for chest pain and palpitations.   Gastrointestinal:  Negative for abdominal pain, blood in stool, constipation, diarrhea, nausea and vomiting.   Endocrine: Negative for cold intolerance, heat intolerance, polydipsia, polyphagia and polyuria.   Genitourinary:  Negative for difficulty urinating and dysuria.   Musculoskeletal:  Negative for arthralgias and myalgias.   Skin:  Negative for rash.   Allergic/Immunologic: Positive for environmental allergies (spring/fall).   Neurological:  Negative for dizziness and headaches.   Psychiatric/Behavioral:  Negative for dysphoric mood and sleep disturbance.        Objective   /70   Pulse 62   Temp 36.6 °C (97.8 °F)   Wt 78.6 kg (173 lb 3.2 oz)   SpO2 97%   BMI 27.97 kg/m²     Physical Exam  Vitals and nursing note reviewed.   Constitutional:       General: He is not in acute distress.     Appearance: Normal appearance. He is not toxic-appearing.   HENT:      Head: Normocephalic.      Right Ear: Tympanic membrane normal.      Left Ear: Tympanic membrane normal.      Nose: Nose normal.      Mouth/Throat:      Pharynx: Oropharynx is clear.   Eyes:      General: No scleral icterus.     Pupils: Pupils are equal, round, and reactive to light.   Neck:      Vascular: No carotid bruit.   Cardiovascular:       Rate and Rhythm: Normal rate and regular rhythm.      Pulses: Normal pulses.      Heart sounds: No murmur heard.  Pulmonary:      Effort: Pulmonary effort is normal. No respiratory distress.      Breath sounds: Normal breath sounds.   Abdominal:      General: Bowel sounds are normal.      Palpations: Abdomen is soft.      Tenderness: There is no abdominal tenderness. There is no guarding.   Genitourinary:     Comments: Declines exam  Musculoskeletal:         General: No tenderness.      Cervical back: Neck supple.      Right lower leg: No edema.      Left lower leg: No edema.   Lymphadenopathy:      Cervical: No cervical adenopathy.   Skin:     General: Skin is warm.   Neurological:      General: No focal deficit present.      Mental Status: He is alert.      Cranial Nerves: No cranial nerve deficit.   Psychiatric:         Mood and Affect: Mood normal.         Assessment/Plan   Problem List Items Addressed This Visit             ICD-10-CM    Intrinsic asthma without status asthmaticus ([facility]) J45.20    Relevant Medications    montelukast (Singulair) 10 mg tablet    Mixed hyperlipidemia E78.2    Relevant Orders    Comprehensive Metabolic Panel    Lipid Panel    Allergic rhinitis J30.9    Relevant Medications    montelukast (Singulair) 10 mg tablet     Other Visit Diagnoses         Codes    Routine general medical examination at a health care facility    -  Primary Z00.00    Screening for prostate cancer     Z12.5    Relevant Orders    Prostate Specific Antigen    Need for hepatitis C screening test     Z11.59    Relevant Orders    Hepatitis C Antibody    Screen for colon cancer     Z12.11    Relevant Orders    Cologuard® colon cancer screening    Prediabetes     R73.03    Relevant Orders    Hemoglobin A1C    Liver function study, abnormal     R94.5    Relevant Orders    Comprehensive Metabolic Panel    Overweight (BMI 25.0-29.9)     E66.3        Discussed blood work and wellness issues. Reviewed screenings and  immunizations. Recommendations given. Declines vaccines.     Declines CAC test or statin.

## 2024-10-22 NOTE — PATIENT INSTRUCTIONS
Recommend a predominant low fat whole foods plant based diet.  Cut back on meat, dairy, processed carbs, salt and oils(especially palm and coconut). Increase fiber in your diet.  Decrease alcohol as much as possible if you drink. Recommend regular exercise most days of the week(goal up to 150min per week). Also recommend good sleep habits aiming for 7-8 hours per night.     Continue your current meds    You were referred for cologuard    Return in 6 months, sooner if needed

## 2024-12-23 ENCOUNTER — TELEPHONE (OUTPATIENT)
Dept: PRIMARY CARE | Facility: CLINIC | Age: 54
End: 2024-12-23
Payer: COMMERCIAL

## 2024-12-23 NOTE — TELEPHONE ENCOUNTER
Pt' s wife called rx line today @ 8:25 requesting refill on singulair. Please send to [facility].    Last ov:10/22  Next ov:04/22

## 2025-01-11 LAB — NONINV COLON CA DNA+OCC BLD SCRN STL QL: NORMAL

## 2025-03-04 LAB — NONINV COLON CA DNA+OCC BLD SCRN STL QL: NEGATIVE

## 2025-04-20 LAB
ALBUMIN SERPL-MCNC: 4.1 G/DL (ref 3.6–5.1)
ALP SERPL-CCNC: 75 U/L (ref 35–144)
ALT SERPL-CCNC: 20 U/L (ref 9–46)
ANION GAP SERPL CALCULATED.4IONS-SCNC: 7 MMOL/L (CALC) (ref 7–17)
AST SERPL-CCNC: 17 U/L (ref 10–35)
BILIRUB SERPL-MCNC: 0.7 MG/DL (ref 0.2–1.2)
BUN SERPL-MCNC: 13 MG/DL (ref 7–25)
CALCIUM SERPL-MCNC: 8.9 MG/DL (ref 8.6–10.3)
CHLORIDE SERPL-SCNC: 106 MMOL/L (ref 98–110)
CHOLEST SERPL-MCNC: 167 MG/DL
CHOLEST/HDLC SERPL: 3.8 (CALC)
CO2 SERPL-SCNC: 24 MMOL/L (ref 20–32)
CREAT SERPL-MCNC: 0.85 MG/DL (ref 0.7–1.3)
EGFRCR SERPLBLD CKD-EPI 2021: 103 ML/MIN/1.73M2
EST. AVERAGE GLUCOSE BLD GHB EST-MCNC: 108 MG/DL
EST. AVERAGE GLUCOSE BLD GHB EST-SCNC: 6 MMOL/L
GLUCOSE SERPL-MCNC: 103 MG/DL (ref 65–99)
HBA1C MFR BLD: 5.4 %
HCV AB SERPL QL IA: NORMAL
HDLC SERPL-MCNC: 44 MG/DL
LDLC SERPL CALC-MCNC: 109 MG/DL (CALC)
NONHDLC SERPL-MCNC: 123 MG/DL (CALC)
POTASSIUM SERPL-SCNC: 4.3 MMOL/L (ref 3.5–5.3)
PROT SERPL-MCNC: 6.7 G/DL (ref 6.1–8.1)
PSA SERPL-MCNC: 0.7 NG/ML
SODIUM SERPL-SCNC: 137 MMOL/L (ref 135–146)
TRIGL SERPL-MCNC: 57 MG/DL

## 2025-04-22 ENCOUNTER — APPOINTMENT (OUTPATIENT)
Dept: PRIMARY CARE | Facility: CLINIC | Age: 55
End: 2025-04-22
Payer: COMMERCIAL

## 2025-04-22 VITALS
WEIGHT: 171 LBS | OXYGEN SATURATION: 97 % | HEART RATE: 91 BPM | DIASTOLIC BLOOD PRESSURE: 76 MMHG | SYSTOLIC BLOOD PRESSURE: 142 MMHG | TEMPERATURE: 97.7 F | BODY MASS INDEX: 27.61 KG/M2

## 2025-04-22 DIAGNOSIS — J30.9 ALLERGIC RHINITIS, UNSPECIFIED SEASONALITY, UNSPECIFIED TRIGGER: ICD-10-CM

## 2025-04-22 DIAGNOSIS — M25.562 CHRONIC PAIN OF LEFT KNEE: ICD-10-CM

## 2025-04-22 DIAGNOSIS — R73.03 PREDIABETES: ICD-10-CM

## 2025-04-22 DIAGNOSIS — J45.20 MILD INTERMITTENT INTRINSIC ASTHMA WITHOUT STATUS ASTHMATICUS WITHOUT COMPLICATION (HHS-HCC): ICD-10-CM

## 2025-04-22 DIAGNOSIS — G89.29 CHRONIC PAIN OF LEFT KNEE: ICD-10-CM

## 2025-04-22 DIAGNOSIS — E78.2 MIXED HYPERLIPIDEMIA: Primary | ICD-10-CM

## 2025-04-22 PROCEDURE — 99214 OFFICE O/P EST MOD 30 MIN: CPT | Performed by: FAMILY MEDICINE

## 2025-04-22 RX ORDER — DILTIAZEM HYDROCHLORIDE 60 MG/1
2 TABLET, FILM COATED ORAL
Qty: 10.2 G | Refills: 3 | Status: CANCELLED | OUTPATIENT
Start: 2025-04-22 | End: 2026-04-22

## 2025-04-22 RX ORDER — MONTELUKAST SODIUM 10 MG/1
10 TABLET ORAL NIGHTLY
Qty: 90 TABLET | Refills: 1 | Status: SHIPPED | OUTPATIENT
Start: 2025-04-22 | End: 2025-10-19

## 2025-04-22 ASSESSMENT — ENCOUNTER SYMPTOMS
MYALGIAS: 0
FATIGUE: 0
VOMITING: 0
NAUSEA: 0
CONSTIPATION: 0
DYSPHORIC MOOD: 0
POLYPHAGIA: 0
DYSURIA: 0
DIFFICULTY URINATING: 0
DIARRHEA: 0
ABDOMINAL PAIN: 0
POLYDIPSIA: 0
SLEEP DISTURBANCE: 0
SHORTNESS OF BREATH: 0
BLOOD IN STOOL: 0
HEADACHES: 0
DIZZINESS: 0
PALPITATIONS: 0

## 2025-04-22 NOTE — PATIENT INSTRUCTIONS
Recommend a predominant low fat whole foods plant based diet.  Cut back on meat, dairy, processed carbs, salt and oils(especially palm and coconut). Increase fiber in your diet.  Decrease alcohol as much as possible if you drink. Recommend regular exercise most days of the week(goal up to 150min per week). Also recommend good sleep habits aiming for 7-8 hours per night.     Continue your current med    Call for ortho referral if pain worsening in knee    Return in 6 months for recheck, sooner if needed

## 2025-04-22 NOTE — PROGRESS NOTES
Subjective   Patient ID: Umer Gallegos is a 55 y.o. male who presents for Asthma and Hyperlipidemia (Recheck, review labs.) multiple issues    Asthma  There is no shortness of breath. Pertinent negatives include no chest pain, headaches or myalgias. His past medical history is significant for asthma.   Hyperlipidemia  Pertinent negatives include no chest pain, myalgias or shortness of breath.        Lipids: Slight improvement.  HDL 44,  (111), triglycerides 57    Predm: Stable.  A1c unchanged 5.4     Asthma: well controlled.  Rarely using inhalers.    Let knee pain: chronic. Worse in winter and bowling. No meds for symptoms.  Occasional instability.  Occasionally feels swollen.  X-ray from last year negative for acute process.  No mention of arthritis.  Hurts mostly laterally and posterior knee    Review of Systems   Constitutional:  Negative for fatigue.   HENT: Negative.     Eyes:  Negative for visual disturbance.   Respiratory:  Negative for shortness of breath.    Cardiovascular:  Negative for chest pain and palpitations.   Gastrointestinal:  Negative for abdominal pain, blood in stool, constipation, diarrhea, nausea and vomiting.   Endocrine: Negative for cold intolerance, heat intolerance, polydipsia, polyphagia and polyuria.   Genitourinary:  Negative for difficulty urinating and dysuria.   Musculoskeletal:  Negative for myalgias.   Skin:  Negative for rash.   Allergic/Immunologic: Negative for environmental allergies.   Neurological:  Negative for dizziness and headaches.   Psychiatric/Behavioral:  Negative for dysphoric mood and sleep disturbance.        Objective   /76   Pulse 91   Temp 36.5 °C (97.7 °F)   Wt 77.6 kg (171 lb)   SpO2 97%   BMI 27.61 kg/m²     Physical Exam  Vitals and nursing note reviewed.   Constitutional:       General: He is not in acute distress.     Appearance: Normal appearance. He is not toxic-appearing.   HENT:      Head: Normocephalic.   Eyes:      General:  No scleral icterus.     Pupils: Pupils are equal, round, and reactive to light.   Neck:      Vascular: No carotid bruit.   Cardiovascular:      Rate and Rhythm: Normal rate and regular rhythm.      Heart sounds: No murmur heard.  Pulmonary:      Effort: Pulmonary effort is normal. No respiratory distress.      Breath sounds: Normal breath sounds.   Musculoskeletal:         General: No tenderness.      Right lower leg: No edema.      Left lower leg: No edema.      Comments: Left knee with full range of motion flexion extension.  Negative Mitchel's.  No joint line tenderness.  Gait normal   Skin:     General: Skin is warm.   Neurological:      General: No focal deficit present.      Mental Status: He is alert.      Cranial Nerves: No cranial nerve deficit.   Psychiatric:         Mood and Affect: Mood normal.         Assessment/Plan   Problem List Items Addressed This Visit           ICD-10-CM    Intrinsic asthma without status asthmaticus ([facility]) J45.20    Relevant Medications    montelukast (Singulair) 10 mg tablet    Other Relevant Orders    Follow Up In Primary Care    Mixed hyperlipidemia - Primary E78.2    Relevant Orders    Comprehensive Metabolic Panel    Lipid Panel    Follow Up In Primary Care    Allergic rhinitis J30.9    Relevant Medications    montelukast (Singulair) 10 mg tablet    Other Relevant Orders    Follow Up In Primary Care     Other Visit Diagnoses         Codes      Prediabetes     R73.03    Relevant Orders    Comprehensive Metabolic Panel    Follow Up In Primary Care      Chronic pain of left knee     M25.562, G89.29        Reviewed blood work above.  Recommendations given.  Continues to decline statin.  Declines Ortho referral.  Can call if changes

## 2025-04-24 ENCOUNTER — TELEPHONE (OUTPATIENT)
Dept: PRIMARY CARE | Facility: CLINIC | Age: 55
End: 2025-04-24
Payer: COMMERCIAL

## 2025-04-24 NOTE — TELEPHONE ENCOUNTER
Pt wife called stating pt was seen 4/22 and has decided to get MRI of Knee. I informed wife per note, pt was to cb for referral to ortho.    Please advise Thx

## 2025-04-25 DIAGNOSIS — G89.29 CHRONIC PAIN OF LEFT KNEE: Primary | ICD-10-CM

## 2025-04-25 DIAGNOSIS — M25.562 CHRONIC PAIN OF LEFT KNEE: Primary | ICD-10-CM

## 2025-05-02 ENCOUNTER — HOSPITAL ENCOUNTER (OUTPATIENT)
Dept: RADIOLOGY | Facility: CLINIC | Age: 55
Discharge: HOME | End: 2025-05-02
Payer: COMMERCIAL

## 2025-05-02 ENCOUNTER — OFFICE VISIT (OUTPATIENT)
Dept: ORTHOPEDIC SURGERY | Facility: CLINIC | Age: 55
End: 2025-05-02
Payer: COMMERCIAL

## 2025-05-02 VITALS — HEIGHT: 66 IN | BODY MASS INDEX: 27.48 KG/M2 | WEIGHT: 171 LBS

## 2025-05-02 DIAGNOSIS — M25.562 LEFT KNEE PAIN, UNSPECIFIED CHRONICITY: ICD-10-CM

## 2025-05-02 DIAGNOSIS — M17.12 PRIMARY OSTEOARTHRITIS OF LEFT KNEE: Primary | ICD-10-CM

## 2025-05-02 PROCEDURE — 3008F BODY MASS INDEX DOCD: CPT | Performed by: EMERGENCY MEDICINE

## 2025-05-02 PROCEDURE — 73564 X-RAY EXAM KNEE 4 OR MORE: CPT | Mod: LT

## 2025-05-02 PROCEDURE — 99204 OFFICE O/P NEW MOD 45 MIN: CPT | Performed by: EMERGENCY MEDICINE

## 2025-05-02 RX ORDER — MELOXICAM 15 MG/1
15 TABLET ORAL DAILY
Qty: 30 TABLET | Refills: 0 | Status: SHIPPED | OUTPATIENT
Start: 2025-05-02 | End: 2025-06-01

## 2025-05-02 ASSESSMENT — PAIN SCALES - GENERAL: PAINLEVEL_OUTOF10: 8

## 2025-05-02 ASSESSMENT — PAIN - FUNCTIONAL ASSESSMENT: PAIN_FUNCTIONAL_ASSESSMENT: 0-10

## 2025-05-02 NOTE — PROGRESS NOTES
Subjective    Patient ID: Umer Gallegos is a 55 y.o. male.    Chief Complaint: Pain of the Left Knee (NPV Patient is presenting with chronic left knee pain. Pain does not radiate. No numbness or tingling. Pain is not constant but increases when walking on uneven ground or up steps. 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 Pain has progressively gotten worse. No pain medication. Sometimes has difficulty completing daily tasks. Unsure if he completed pt for this knee. No history of surgery or injection on this knee. Would like to discuss an injection. XR done today. )     Last Surgery: No surgery found  Last Surgery Date: No surgery found    Umer is a very pleasant 55-year-old male who is coming in with some acute on chronic left knee pain.  He states that he works on his feet all day and lives in [place].  He was recently seen by his PCP and was referred here by Dr. Aguilar for conservative orthopedic evaluation and management.  He has been having medial compartment left knee pain for several years that happened after he was wrestling with someone.  He states that he has trouble after walking long distances, bowling, riding his motorcycle, or mowing the grass.  He has not taken any medications and has not yet seen a provider for this issue.  He does have intermittent swelling.  No infectious symptoms.  He would like to avoid surgery if at all possible.  He has had injections in his right knee before but not his left.        Objective   Right Knee Exam     Muscle Strength   The patient has normal right knee strength.      Left Knee Exam     Muscle Strength   The patient has normal left knee strength.    Tenderness   The patient is experiencing tenderness in the medial joint line.    Range of Motion   The patient has normal left knee ROM.  Extension:  normal   Flexion:  normal     Tests   Mitchel:  Medial - negative Lateral - negative  Varus: negative Valgus: negative  Lachman:   Anterior - negative      Drawer:  Anterior - negative     Posterior - negative  Patellar apprehension: negative    Other   Erythema: absent  Sensation: normal  Swelling: none  Effusion: no effusion present    Comments:  Knee extension against resistance is intact            Image Results:  XR knee 4+ views bilateral  Narrative: Interpreted By:  SCOT LOWRY MD  MRN: [id number]  Patient Name: LYNDON DAMIAN     STUDY:  BILATERAL KNEE; COMPLT, 4 OR MORE VIEWS;  4/20/2023 8:38 am     INDICATION:  pain.     COMPARISON:  Right knee 07/11/2017     ACCESSION NUMBER(S):  [id number]     ORDERING CLINICIAN:  BERTHA GOMEZ     FINDINGS:  Bony structures:  Intact     Joint spaces:  Maintained     Soft tissues:  Unremarkable without significant edema or radiodense  foreign body     Other:  None significant     Impression: No acute findings.    X-rays of the left knee were reviewed and interpreted by me on 5/2/2025.  Minimal degenerative changes.  On the tunnel view he has some changes over the medial condyle of the distal femur suggestive of a possible OCD lesion.  No evidence of acute injury or fracture.    Assessment/Plan   Encounter Diagnoses:  Primary osteoarthritis of left knee    Left knee pain, unspecified chronicity    Orders Placed This Encounter    XR knee left 4+ views     No follow-ups on file.    We discussed his treatment options.  Even if he does have an OCD lesion or a meniscal tear, he wants to avoid surgery, has minimal symptoms here today, and has been dealing with this for several years.  He therefore wants to avoid an MRI immediately with an urgent surgical referral which I think is appropriate.  Instead we are going to start having him take Tylenol 1000 mg up to 3 times daily and I am also going to prescribe him meloxicam to take daily.  He is going to start physical therapy where he can work on developing and implementing a home exercise program and then he is going to follow-up with me in 1 to 2 months to  see how he is doing and whether or not we should do a cortisone injection in the left knee.  Ultimately if his symptoms do not improve we could then consider an MRI since he does have pretty good joint spacing on his x-ray.    ** Please excuse any errors in grammar or translation related to this dictation. Voice recognition software was utilized to prepare this document. **       Kristofer Fletcher MD  [facility]

## 2025-05-19 ENCOUNTER — EVALUATION (OUTPATIENT)
Dept: PHYSICAL THERAPY | Facility: HOSPITAL | Age: 55
End: 2025-05-19
Payer: COMMERCIAL

## 2025-05-19 DIAGNOSIS — M17.12 PRIMARY OSTEOARTHRITIS OF LEFT KNEE: ICD-10-CM

## 2025-05-19 DIAGNOSIS — R29.898 LEFT LEG WEAKNESS: Primary | ICD-10-CM

## 2025-05-19 PROCEDURE — 97110 THERAPEUTIC EXERCISES: CPT | Mod: GP | Performed by: PHYSICAL THERAPIST

## 2025-05-19 PROCEDURE — 97161 PT EVAL LOW COMPLEX 20 MIN: CPT | Mod: GP | Performed by: PHYSICAL THERAPIST

## 2025-05-19 ASSESSMENT — ENCOUNTER SYMPTOMS
DEPRESSION: 0
LOSS OF SENSATION IN FEET: 0
OCCASIONAL FEELINGS OF UNSTEADINESS: 1

## 2025-05-19 ASSESSMENT — PAIN - FUNCTIONAL ASSESSMENT: PAIN_FUNCTIONAL_ASSESSMENT: 0-10

## 2025-05-19 ASSESSMENT — PAIN SCALES - GENERAL: PAINLEVEL_OUTOF10: 0 - NO PAIN

## 2025-05-19 ASSESSMENT — PATIENT HEALTH QUESTIONNAIRE - PHQ9
1. LITTLE INTEREST OR PLEASURE IN DOING THINGS: NOT AT ALL
SUM OF ALL RESPONSES TO PHQ9 QUESTIONS 1 AND 2: 0
2. FEELING DOWN, DEPRESSED OR HOPELESS: NOT AT ALL

## 2025-05-19 NOTE — PROGRESS NOTES
Physical Therapy    Physical Therapy Evaluation    Patient Name: Umer Gallegos  MRN: [id number]  Today's Date: 5/19/2025  Time Calculation  Start Time: 1600  Stop Time: 1645  Time Calculation (min): 45 min    PT Evaluation Time Entry  PT Evaluation (Low) Time Entry: 37  PT Therapeutic Procedures Time Entry  Therapeutic Exercise Time Entry: 8                   Visit # 1  Assessment  PT Assessment Results: Decreased strength, Pain  Rehab Prognosis: Good  Evaluation/Treatment Tolerance: Patient tolerated treatment well  Pt. Is a 54 y/o male with L knee pain going up and down stairs, he points to his pain medially at the anterior jt. Line, and describes it as deep inside pain. Pt. Is with WNL ROM, decreased L hip abductor strength, and L HS strength. He is unable to perform a LAQ without pain medially at L knee anterior joint line. Pt. Would benefit from skilled PT to address the above deficits. Pt. Wishes to perform HEP at home and will schedule as needed if issues arise.     Plan  Treatment/Interventions: Cryotherapy, Hot pack, Education/ Instruction, Self care/ home management, Therapeutic exercises, Therapeutic activities, Aquatic therapy, Manual therapy, Electrical stimulation  PT Plan: Skilled PT  Rehab Potential: Good  Plan of Care Agreement: Patient   Hold x 1 month.     Current Problem  1. Left leg weakness  Follow Up In Physical Therapy      2. Primary osteoarthritis of left knee  Referral to Physical Therapy    Follow Up In Physical Therapy          Subjective   Pt. With L knee pain that he hurt years ago in wrestling and he is with increase pain recently.  Pt. States his new pills have helped, and his knee is not bothering him as bad.     Pt. States he is cont. With slight pain L knee with walking up the stairs and down, and bowling.       General:  General  Reason for Referral: intitial eval  Referred By: Dr. Justine MD  Preferred Learning Style: verbal  Precautions:  Precautions  STEADI Fall Risk  Score (The score of 4 or more indicates an increased risk of falling): 3  Medical Precautions:  (asthma;)       Pain:  Pain Assessment: 0-10  0-10 (Numeric) Pain Score: 0 - No pain  Pain Type: Chronic pain  Pain Location: Knee  Pain Orientation: Left  Home Living:  wnl   Prior Function Per Pt/Caregiver Report:  Part  - sitting mostly.  Indep ADL's.     Objective   Posture:    wfl       Range of Motion:    AROM : 0-135   Strength:    BIIL LE strength : wnl except: L hip abd 4+/5;  L knee flexion : 4/5    Pt. Unable to perform LAQ or SAQ without L knee medial anterior jt. Line pain.    Flexibility:    L HS wfl   L Hip IR / ER wfl    Palpation:  Patellar mobs : wnl  No pain to palpation L knee   Special Tests:  L knee :   Varus test: (-)  Valgus test (-)   Appley (-)     Gait:. Pt. Amb. Indep wfl 100 feet x2        Other:  Visit 1: Eval and HEP    EXERCISES       Date       VISIT# # # # #    REPS REPS REPS REPS                 Reassess                                                                                                  HEP                 Outcome Measures:   47 LEFS     OP EDUCATION:  Access Code: [email or web address]  URL: [email or web address]  Date: 05/19/2025  Prepared by: Ambika Schaffer    Exercises  - Supine Bridge  - 1 x daily - 7 x weekly - 3 sets - 10 reps  - Clamshell  - 1 x daily - 7 x weekly - 3 sets - 10 reps  - Supine Quadricep Sets  - 1 x daily - 7 x weekly - 3 sets - 10 reps - 5sec hold  - Gastroc Stretch on Wall  - 1 x daily - 7 x weekly - 3 sets - 30sec hold  - Seated Hamstring Stretch  - 1 x daily - 7 x weekly - 3 sets - 30sec hold  Outpatient Education  Individual(s) Educated: Patient  Education Provided: Home Exercise Program, POC  Risk and Benefits Discussed with Patient/Caregiver/Other: yes  Patient/Caregiver Demonstrated Understanding: yes  Plan of Care Discussed and Agreed Upon: yes  Patient Response to Education: Patient/Caregiver Verbalized Understanding of  Information    Goals:  Active       L LE weakness; primary OA L knee       Pt. will c/o less than 2/10 L knee pain with all ADL's.       Start:  05/19/25    Expected End:  07/19/25            Pt. Will be indep with progressive HEP to cont. Progress made in PT.        Start:  05/19/25    Expected End:  08/19/25            Pt. will increase L LE strength to wnl to allow increase up and down the stairs.        Start:  05/19/25    Expected End:  08/19/25

## 2025-06-24 ENCOUNTER — APPOINTMENT (OUTPATIENT)
Dept: ORTHOPEDIC SURGERY | Facility: CLINIC | Age: 55
End: 2025-06-24
Payer: COMMERCIAL

## 2025-06-24 VITALS — BODY MASS INDEX: 27.48 KG/M2 | HEIGHT: 66 IN | WEIGHT: 171 LBS

## 2025-06-24 DIAGNOSIS — M17.12 PRIMARY OSTEOARTHRITIS OF LEFT KNEE: Primary | ICD-10-CM

## 2025-06-24 PROCEDURE — 3008F BODY MASS INDEX DOCD: CPT | Performed by: EMERGENCY MEDICINE

## 2025-06-24 PROCEDURE — 99213 OFFICE O/P EST LOW 20 MIN: CPT | Performed by: EMERGENCY MEDICINE

## 2025-06-24 ASSESSMENT — PAIN - FUNCTIONAL ASSESSMENT: PAIN_FUNCTIONAL_ASSESSMENT: NO/DENIES PAIN

## 2025-06-24 ASSESSMENT — ENCOUNTER SYMPTOMS: KNEE DEFORMITY: 1

## 2025-06-24 NOTE — PROGRESS NOTES
Subjective    Patient ID: Umer Gallegos is a 55 y.o. male.    Chief Complaint: Follow-up of the Left Knee (Patient presents today with left knee follow up, Home Physical Therapy, has great improvement, no concerns)     Last Surgery: No surgery found  Last Surgery Date: No surgery found    Umer is a very pleasant 55-year-old male who is coming in with some acute on chronic left knee pain.  He states that he works on his feet all day and lives in [place].  He was recently seen by his PCP and was referred here by Dr. Aguilar for conservative orthopedic evaluation and management.  He has been having medial compartment left knee pain for several years that happened after he was wrestling with someone.  He states that he has trouble after walking long distances, bowling, riding his motorcycle, or mowing the grass.  He has not taken any medications and has not yet seen a provider for this issue.  He does have intermittent swelling.  No infectious symptoms.  He would like to avoid surgery if at all possible.  He has had injections in his right knee before but not his left. We discussed his treatment options.  Even if he does have an OCD lesion or a meniscal tear, he wants to avoid surgery, has minimal symptoms here today, and has been dealing with this for several years.  He therefore wants to avoid an MRI immediately with an urgent surgical referral which I think is appropriate.  Instead we are going to start having him take Tylenol 1000 mg up to 3 times daily and I am also going to prescribe him meloxicam to take daily.  He is going to start physical therapy where he can work on developing and implementing a home exercise program and then he is going to follow-up with me in 1 to 2 months to see how he is doing and whether or not we should do a cortisone injection in the left knee.  Ultimately if his symptoms do not improve we could then consider an MRI since he does have pretty good joint spacing on his  x-ray.    Update on 6/24/1935.  Umer is coming back in with his left knee for a follow-up visit.  He has been doing Tylenol and occasional meloxicam.  He has been very compliant with physical therapy and his home exercise program.  We did not order an MRI or do an injection at his last visit and he is here today to discuss those options again.  He states that over Memorial Day weekend his granddaughter jumped into his arms and his knee did buckle.  He fell to the floor and the next day had a little bit of discomfort but took meloxicam and then all of his pain resolved.  Aside from that issue he says that he is 100% better and has no complaints.    Left Knee         Objective   Right Knee Exam     Muscle Strength   The patient has normal right knee strength.      Left Knee Exam     Muscle Strength   The patient has normal left knee strength.    Tenderness   The patient is experiencing no tenderness.     Range of Motion   The patient has normal left knee ROM.  Extension:  normal   Flexion:  normal     Tests   Mitchel:  Medial - negative Lateral - negative  Varus: negative Valgus: negative  Lachman:  Anterior - negative      Drawer:  Anterior - negative     Posterior - negative  Patellar apprehension: negative    Other   Erythema: absent  Sensation: normal  Swelling: none  Effusion: no effusion present    Comments:  Knee extension against resistance is intact            Image Results:  XR knee left 4+ views  Narrative: Interpreted By:  Jonathan Anne,   STUDY:  XR KNEE LEFT 4+ VIEWS  5/2/2025 9:48 am      INDICATION:  Signs/Symptoms:PAIN      COMPARISON:  04/20/2023      ACCESSION NUMBER(S):  [id number]      ORDERING CLINICIAN:  NBA VELASQUEZ      TECHNIQUE:  4 views of the left knee including AP, lateral, patellar sunrise and  knee tunnel projections were obtained.      FINDINGS:  There is no evidence of acute fracture or dislocation identified. The  joint spaces are well preserved throughout without  significant  degenerative changes. No suprapatellar joint effusion is present.      Impression: 1. No acute fracture or dislocation.      MACRO:  None.      Signed by: Jonathan Anne 5/3/2025 11:15 AM  Dictation workstation:   [id number]    No new imaging    Assessment/Plan   Encounter Diagnoses:  Primary osteoarthritis of left knee    No orders of the defined types were placed in this encounter.    No follow-ups on file.    We discussed his treatment options and overall agree that he is doing much better than his last visit here.  He is going to continue his home exercise plan and can also continue doing Tylenol/meloxicam as needed for any breakthrough pain.  He is going to follow-up with me as needed moving forward specifically if/when he would like to move forward with additional imaging or injections.    ** Please excuse any errors in grammar or translation related to this dictation. Voice recognition software was utilized to prepare this document. **       Kristofer Fletcher MD  [facility] Medicine

## 2025-06-27 ENCOUNTER — DOCUMENTATION (OUTPATIENT)
Dept: PHYSICAL THERAPY | Facility: HOSPITAL | Age: 55
End: 2025-06-27
Payer: COMMERCIAL

## 2025-06-27 DIAGNOSIS — R29.898 LEFT LEG WEAKNESS: Primary | ICD-10-CM

## 2025-06-27 NOTE — PROGRESS NOTES
Physical Therapy    Discharge Summary    Name: Umer Gallegos  MRN: [id number]  : 1970  Date: 25    Discharge Summary: PT      Rehab Discharge Reason: Failed to schedule and/or keep follow-up appointment(s)

## 2025-10-23 ENCOUNTER — APPOINTMENT (OUTPATIENT)
Dept: PRIMARY CARE | Facility: CLINIC | Age: 55
End: 2025-10-23
Payer: COMMERCIAL